# Patient Record
Sex: FEMALE | Race: WHITE | NOT HISPANIC OR LATINO | Employment: STUDENT | URBAN - METROPOLITAN AREA
[De-identification: names, ages, dates, MRNs, and addresses within clinical notes are randomized per-mention and may not be internally consistent; named-entity substitution may affect disease eponyms.]

---
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
## 2021-10-12 ENCOUNTER — OFFICE VISIT (OUTPATIENT)
Dept: FAMILY MEDICINE CLINIC | Facility: CLINIC | Age: 21
End: 2021-10-12
Payer: COMMERCIAL

## 2021-10-12 VITALS
TEMPERATURE: 96.8 F | DIASTOLIC BLOOD PRESSURE: 68 MMHG | SYSTOLIC BLOOD PRESSURE: 92 MMHG | BODY MASS INDEX: 19.46 KG/M2 | HEIGHT: 64 IN | OXYGEN SATURATION: 98 % | WEIGHT: 114 LBS | RESPIRATION RATE: 18 BRPM | HEART RATE: 87 BPM

## 2021-10-12 DIAGNOSIS — Z00.00 WELL ADULT EXAM: Primary | ICD-10-CM

## 2021-10-12 PROCEDURE — 99173 VISUAL ACUITY SCREEN: CPT | Performed by: FAMILY MEDICINE

## 2021-10-12 PROCEDURE — 92551 PURE TONE HEARING TEST AIR: CPT | Performed by: FAMILY MEDICINE

## 2021-10-12 PROCEDURE — 3725F SCREEN DEPRESSION PERFORMED: CPT | Performed by: FAMILY MEDICINE

## 2021-10-12 PROCEDURE — 3008F BODY MASS INDEX DOCD: CPT | Performed by: FAMILY MEDICINE

## 2021-10-12 PROCEDURE — 99395 PREV VISIT EST AGE 18-39: CPT | Performed by: FAMILY MEDICINE

## 2021-10-12 PROCEDURE — 1036F TOBACCO NON-USER: CPT | Performed by: FAMILY MEDICINE

## 2021-10-12 RX ORDER — SUMATRIPTAN 100 MG/1
1 TABLET, FILM COATED ORAL ONCE AS NEEDED
COMMUNITY
Start: 2021-08-04

## 2021-10-12 RX ORDER — PAROXETINE HYDROCHLORIDE 20 MG/1
1 TABLET, FILM COATED ORAL DAILY
COMMUNITY
Start: 2021-09-13

## 2021-11-24 ENCOUNTER — OFFICE VISIT (OUTPATIENT)
Dept: FAMILY MEDICINE CLINIC | Facility: CLINIC | Age: 21
End: 2021-11-24
Payer: COMMERCIAL

## 2021-11-24 VITALS
WEIGHT: 117 LBS | DIASTOLIC BLOOD PRESSURE: 60 MMHG | HEART RATE: 88 BPM | SYSTOLIC BLOOD PRESSURE: 100 MMHG | RESPIRATION RATE: 16 BRPM | BODY MASS INDEX: 19.97 KG/M2 | HEIGHT: 64 IN | TEMPERATURE: 96.2 F

## 2021-11-24 DIAGNOSIS — N60.11 FIBROCYSTIC BREAST CHANGES OF BOTH BREASTS: Primary | ICD-10-CM

## 2021-11-24 DIAGNOSIS — N60.12 FIBROCYSTIC BREAST CHANGES OF BOTH BREASTS: Primary | ICD-10-CM

## 2021-11-24 PROCEDURE — 3008F BODY MASS INDEX DOCD: CPT | Performed by: FAMILY MEDICINE

## 2021-11-24 PROCEDURE — 99213 OFFICE O/P EST LOW 20 MIN: CPT | Performed by: FAMILY MEDICINE

## 2021-11-24 PROCEDURE — 1036F TOBACCO NON-USER: CPT | Performed by: FAMILY MEDICINE

## 2023-08-17 NOTE — PROGRESS NOTES
Pt states she was out of school Mon  And Tuesday with S T , and H/A   Also did not go to work tonight b/c still feeling "tired" needs note Detail Level: Zone General Sunscreen Counseling: I recommended a broad spectrum sunscreen with a SPF of 30 or higher.  I explained that SPF 30 sunscreens block approximately 97 percent of the sun's harmful rays.  Sunscreens should be applied at least 15 minutes prior to expected sun exposure and then every 2 hours after that as long as sun exposure continues. If swimming or exercising sunscreen should be reapplied every 45 minutes to an hour after getting wet or sweating.  One ounce, or the equivalent of a shot glass full of sunscreen, is adequate to protect the skin not covered by a bathing suit. I also recommended a lip balm with a sunscreen as well. Sun protective clothing can be used in lieu of sunscreen but must be worn the entire time you are exposed to the sun's rays. Products Recommended: And hats and covering skin

## 2023-12-26 NOTE — PATIENT INSTRUCTIONS
PT Evaluation     Today's date: 2023  Patient name: Joanie Dobbins  : 1968  MRN: [id number]  Referring provider: Amanda Moreno MD  Dx:   Encounter Diagnosis     ICD-10-CM    1. Tendinitis of right rotator cuff  M75.81 Ambulatory Referral to Physical Therapy          Start Time: 1425          Assessment  Assessment details: Joanie Dobbins is a 55 y.o. female presenting to outpatient physical therapy at [facility] with complaints of R Shoulder pain.  They present with decreased range of motion, decreased strength, limited flexibility, poor postural awareness, poor body mechanics, poor balance, decreased tolerance to activity and decreased functional mobility due to Tendinitis of right rotator cuff  (primary encounter diagnosis).  They would benefit from skilled physical therapy to address noted impairments in order to allow for full functional return to work and ADL-related activities. Thank you for the referral!  Impairments: abnormal muscle firing, abnormal or restricted ROM, activity intolerance, impaired balance, impaired physical strength, lacks appropriate home exercise program, pain with function and poor body mechanics  Barriers to therapy: n/a  Understanding of Dx/Px/POC: excellent  Goals  ST.  Independent with HEP in 2 weeks  2. Decrease pain by 50% in 3 wks  3.  Increase R shoulder flexion AROM to 150 degrees in 3 weeks     LT. Achieve FOTO score of 67/100 in 6 weeks   2.  Able to return to all yoga poses without restriction secondary to pain in 6 weeks  3.  Strength = 5/5 bilat shoulder flex in 6 weeks      Plan  Plan details: RE in 6 weeks.  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 Onset September 2023, 4 months ago. No known JIN. She was doing yoga and the instructor adjusted her arm. Shortly after she noticed shoulder pain. She tried injections without long term relief. She is currently getting certified as a  and her pain is keeping her from completing certain movements.     Recent R shoulder MRI shows: \"IMPRESSION:  1. Severe supraspinatus and mild infraspinatus tendinosis without discrete tear.  2. Mild subacromial/subdeltoid bursitis.  3. Moderate acromioclavicular osteoarthritis.\"    Pain is located anterior shoulder with occasional N/T radiation to the R elbow. Rated 5-8/10. Agg with reaching up or back, lifting, turning the wheel while driving. Eased with ice, hot shower. Positive for sleep disturbance secondary to pain. Denies catching, clicking, locking. Positive for R arm weakness.    Pt works as a mental health therapist; mainly sitting all day. She will hand write her notes. She practices yoga daily, about 14 hrs per week.    Patient Goals  Patient goals for therapy: decreased edema, decreased pain, improved balance, increased motion, return to work, return to sport/leisure activities, independence with ADLs/IADLs and increased strength          Objective     Observations     Additional Observation Details  Rounded shoulders    Tenderness     Additional Tenderness Details  TTP R ant delt, prox biceps, pec nancy/min, UT, LS, supraspin, infraspin, teres, lat, subscap    (-) sulcus  (+) painful arc    Cervical/Thoracic Screen   Cervical range of motion within normal limits  Thoracic range of motion within normal limits    Neurological " "Testing     Sensation     Shoulder   Left Shoulder   Intact: light touch    Right Shoulder   Intact: Light touch    Active Range of Motion   Left Shoulder   Flexion: 155 degrees WFL  Extension: 72 degrees WFL  Abduction: 155 degrees WFL  External rotation 0°: 66 degrees WFL  External rotation BTH: T3 WFL  Internal rotation BTB: T3 WFL    Right Shoulder   Flexion: 147 degrees with pain  Extension: 65 degrees with pain  Abduction: 138 degrees with pain  External rotation 0°: 60 degrees   External rotation BTH: T3 with pain  Internal rotation BTB: T5 with pain    Strength/Myotome Testing     Left Shoulder     Planes of Motion   Flexion: 4+   Extension: 5   Abduction: 5   External rotation at 0°: 4+   Internal rotation at 0°: 4+     Isolated Muscles   Biceps: 5   Triceps: 5     Right Shoulder     Planes of Motion   Flexion: 4+   Extension: 5   Abduction: 5   External rotation at 0°: 4+   Internal rotation at 0°: 4+     Isolated Muscles   Biceps: 5   Triceps: 5              Precautions: severe R supraspin, infra tendinosis      HEP:  Access Code: [email or web address]  URL: [email or web address]  Date: 12/26/2023  Prepared by: Humera Saab    Exercises  - Standing Isometric Shoulder External Rotation with Doorway  - 10 reps - 5 sec hold  - Standing Isometric Shoulder Flexion with Doorway - Arm Bent  - 10 reps - 5 sec hold  - Standing Isometric Shoulder Abduction with Doorway - Arm Bent  - 10 reps - 5 sec hold      Manuals 12/26            R shoulder STM Ant delt, RC            R sh' inf mob GORAN            R sh' AP mob GORAN            R sh' flex iso Elbow bent 15\"x3            R sh' ER iso 15\"x3            R sh' abd iso In S/L, elbow bent 15\"x3                         Neuro Re-Ed                                                                 Ther Ex             UBE - cardio, ROM             pulleys             Sh' flex iso 5\"x10            Sh' ER iso 5\"x10            Sh' IR iso             Sh' abd iso 5\"x10          "   Sh' ext iso             Bicep curl             Tricep ext             Serratus punch             Prone T             Prone Y             Sh' ext             row             scaption                          Ther Activity                                       Gait Training                                       Modalities

## 2024-12-26 ENCOUNTER — OFFICE VISIT (OUTPATIENT)
Dept: FAMILY MEDICINE CLINIC | Facility: CLINIC | Age: 24
End: 2024-12-26
Payer: COMMERCIAL

## 2024-12-26 VITALS
HEART RATE: 96 BPM | HEIGHT: 64 IN | BODY MASS INDEX: 23.82 KG/M2 | WEIGHT: 139.5 LBS | TEMPERATURE: 98.3 F | OXYGEN SATURATION: 100 % | RESPIRATION RATE: 16 BRPM | DIASTOLIC BLOOD PRESSURE: 72 MMHG | SYSTOLIC BLOOD PRESSURE: 124 MMHG

## 2024-12-26 DIAGNOSIS — Z23 ENCOUNTER FOR IMMUNIZATION: ICD-10-CM

## 2024-12-26 DIAGNOSIS — Z12.4 PAP SMEAR FOR CERVICAL CANCER SCREENING: ICD-10-CM

## 2024-12-26 DIAGNOSIS — Z00.00 ANNUAL PHYSICAL EXAM: Primary | ICD-10-CM

## 2024-12-26 PROCEDURE — 99385 PREV VISIT NEW AGE 18-39: CPT

## 2024-12-26 PROCEDURE — 90471 IMMUNIZATION ADMIN: CPT

## 2024-12-26 PROCEDURE — 90715 TDAP VACCINE 7 YRS/> IM: CPT

## 2024-12-26 NOTE — PROGRESS NOTES
Adult Annual Physical  Name: Nely Mitchell      : 2000      MRN: [id number]  Encounter Provider: Cehvy Sumner DO  Encounter Date: 2024   Encounter department: [facility]    Assessment & Plan  Annual physical exam  - screening for cervical cancer: never had pap, would like referral to gyn, provided today  - immunizations: tdap due now, given today  - counseled pt on lifestyle modifications such as diet changes and 150 mins/weekly of moderate intensity exercise        Pap smear for cervical cancer screening    Orders:    Ambulatory Referral to Obstetrics / Gynecology; Future    Encounter for immunization    Orders:    TDAP VACCINE GREATER THAN OR EQUAL TO 8YO IM    Immunizations and preventive care screenings were discussed with patient today. Appropriate education was printed on patient's after visit summary.    Counseling:  Alcohol/drug use: discussed moderation in alcohol intake, the recommendations for healthy alcohol use, and avoidance of illicit drug use.  Dental Health: discussed importance of regular tooth brushing, flossing, and dental visits.  Injury prevention: discussed safety/seat belts, safety helmets, smoke detectors, carbon monoxide detectors, and smoking near bedding or upholstery.  Sexual health: discussed sexually transmitted diseases, partner selection, use of condoms, avoidance of unintended pregnancy, and contraceptive alternatives.  Exercise: the importance of regular exercise/physical activity was discussed. Recommend exercise 3-5 times per week for at least 30 minutes.          History of Present Illness     Adult Annual Physical:  Patient presents for annual physical. 24-year-old female presents to [facility] care and for annual physical.  Past medical history significant for scoliosis and migraines (previously followed with neurology and was on Paxil and as needed sumatriptan but currently off medications except for as needed Tylenol).  Gets migraines about  twice a month around the time of her periods.  Also reports that every once in a while she feels her heart is racing even when she is not anxious.  Typically results resolves within a minute.  Sometimes has associated sharp pain but again this resolves quickly.  Has a history of costochondritis.  No family history of heart disease.  No associated shortness of breath has 1 cup of coffee a day, no energy drinks.  No history of smoking or illicit substance use.    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  She recently completed her masters program.  Would like a referral to.     Diet and Physical Activity:  - Diet/Nutrition: consuming 3-5 servings of fruits/vegetables daily, adequate whole grain intake, well balanced diet and limited junk food.  - Exercise: no formal exercise and walking.    Depression Screening:  - PHQ-2 Score: 0    General Health:  - Sleep: sleeps well and 7-8 hours of sleep on average. does snore  - Hearing: normal hearing bilateral ears.  - Vision: no vision problems, wears glasses and goes for regular eye exams. glasses for distance  - Dental: regular dental visits and brushes teeth twice daily. floss occasionally    /GYN Health:  - Follows with GYN: no.   - Last menstrual cycle: 12/2/2024.   - History of STDs: no  - Contraception:. period lasts 5-6 days, regular      Review of Systems   Constitutional:  Negative for chills and fever.   HENT:  Negative for congestion and rhinorrhea.    Eyes:  Negative for visual disturbance.   Respiratory:  Negative for cough and shortness of breath.    Cardiovascular:  Negative for chest pain and palpitations.   Gastrointestinal:  Negative for abdominal pain, constipation, diarrhea, nausea and vomiting.   Genitourinary:  Negative for dysuria.   Musculoskeletal:  Negative for arthralgias.   Skin:  Negative for rash.   Neurological:  Negative for dizziness, light-headedness and headaches.     Current Outpatient Medications on File Prior to Visit  "  Medication Sig Dispense Refill    Multiple Vitamin (MULTIVITAMIN) capsule Take 1 capsule by mouth daily      [DISCONTINUED] PARoxetine (PAXIL) 20 mg tablet Take 1 tablet by mouth daily      [DISCONTINUED] SUMAtriptan (IMITREX) 100 mg tablet Take 1 tablet by mouth once as needed        No current facility-administered medications on file prior to visit.        Objective   /72 (BP Location: Left arm, Patient Position: Sitting, Cuff Size: Standard)   Pulse 96   Temp 98.3 °F (36.8 °C) (Temporal)   Resp 16   Ht 5' 4\" (1.626 m)   Wt 63.3 kg (139 lb 8 oz)   SpO2 100%   BMI 23.95 kg/m²     Physical Exam  Vitals reviewed.   Constitutional:       Appearance: Normal appearance.   HENT:      Head: Normocephalic and atraumatic.      Right Ear: External ear normal.      Left Ear: External ear normal.      Nose: Nose normal.      Mouth/Throat:      Pharynx: Oropharynx is clear.   Eyes:      Extraocular Movements: Extraocular movements intact.      Conjunctiva/sclera: Conjunctivae normal.   Cardiovascular:      Rate and Rhythm: Normal rate and regular rhythm.      Pulses: Normal pulses.      Heart sounds: Normal heart sounds.   Pulmonary:      Effort: Pulmonary effort is normal.      Breath sounds: Normal breath sounds.   Abdominal:      Palpations: Abdomen is soft.   Musculoskeletal:      Cervical back: Neck supple.      Right lower leg: No edema.      Left lower leg: No edema.   Skin:     General: Skin is warm.   Neurological:      Mental Status: She is alert and oriented to person, place, and time.   Psychiatric:         Mood and Affect: Mood normal.         Behavior: Behavior normal.         Thought Content: Thought content normal.         Judgment: Judgment normal.         "

## 2025-03-08 ENCOUNTER — OFFICE VISIT (OUTPATIENT)
Dept: URGENT CARE | Facility: MEDICAL CENTER | Age: 25
End: 2025-03-08
Payer: COMMERCIAL

## 2025-03-08 VITALS
OXYGEN SATURATION: 98 % | SYSTOLIC BLOOD PRESSURE: 131 MMHG | HEART RATE: 104 BPM | RESPIRATION RATE: 18 BRPM | DIASTOLIC BLOOD PRESSURE: 73 MMHG | TEMPERATURE: 98.2 F

## 2025-03-08 DIAGNOSIS — J02.9 SORE THROAT: Primary | ICD-10-CM

## 2025-03-08 LAB — S PYO AG THROAT QL: NEGATIVE

## 2025-03-08 PROCEDURE — G0383 LEV 4 HOSP TYPE B ED VISIT: HCPCS | Performed by: PHYSICIAN ASSISTANT

## 2025-03-08 PROCEDURE — 87880 STREP A ASSAY W/OPTIC: CPT | Performed by: PHYSICIAN ASSISTANT

## 2025-03-08 PROCEDURE — S9083 URGENT CARE CENTER GLOBAL: HCPCS | Performed by: PHYSICIAN ASSISTANT

## 2025-03-08 PROCEDURE — 87070 CULTURE OTHR SPECIMN AEROBIC: CPT | Performed by: PHYSICIAN ASSISTANT

## 2025-03-08 RX ORDER — PREDNISONE 20 MG/1
40 TABLET ORAL DAILY
Qty: 10 TABLET | Refills: 0 | Status: SHIPPED | OUTPATIENT
Start: 2025-03-08 | End: 2025-03-13

## 2025-03-08 NOTE — PATIENT INSTRUCTIONS
Pharyngitis  Prednisone once daily x 5 days  Salt water gargles  Follow up with PCP in 3-5 days.  Proceed to  ER if symptoms worsen.

## 2025-03-08 NOTE — PROGRESS NOTES
[facility]        NAME: Nely Mitchell is a 24 y.o. female  : 2000    MRN: [id number]  DATE: 2025  TIME: 3:38 PM    Assessment and Plan   Sore throat [J02.9]  1. Sore throat  POCT rapid ANTIGEN strepA            Patient Instructions     Pharyngitis  Prednisone once daily x 5 days  Salt water gargles  Follow up with PCP in 3-5 days.  Proceed to  ER if symptoms worsen.    Chief Complaint     Chief Complaint   Patient presents with    Sore Throat     Sore throat, sowllen glands, loss of voice; started 6 days ago     Laryngitis         History of Present Illness       24-year-old female who presents complaining of sore throat, pain on swallowing, fever Tmax 100.9  that started 3 days ago.  The pain has since resolved but now she has loss of voice.  Has been taking over-the-counter medication for symptom relief.    Sore Throat   This is a new problem. The current episode started in the past 7 days. The problem has been waxing and waning. Neither side of throat is experiencing more pain than the other. The maximum temperature recorded prior to her arrival was 100 - 100.9 F. The fever has been present for Less than 1 day. The pain is at a severity of 1/10. The pain is mild. Associated symptoms include congestion, coughing, ear pain, headaches, a hoarse voice, stridor and swollen glands. Pertinent negatives include no abdominal pain, diarrhea, drooling, ear discharge, plugged ear sensation, neck pain, shortness of breath, trouble swallowing or vomiting.       Review of Systems   Review of Systems   HENT:  Positive for congestion, ear pain, hoarse voice and sore throat. Negative for drooling, ear discharge and trouble swallowing.    Respiratory:  Positive for cough and stridor. Negative for shortness of breath.    Gastrointestinal:  Negative for abdominal pain, diarrhea and vomiting.   Musculoskeletal:  Negative for neck pain.   Neurological:  Positive for headaches.         Current Medications        Current Outpatient Medications:     Multiple Vitamin (MULTIVITAMIN) capsule, Take 1 capsule by mouth daily, Disp: , Rfl:     Current Allergies     Allergies as of 03/08/2025 - Reviewed 03/08/2025   Allergen Reaction Noted    Doxycycline Headache 03/29/2018            The following portions of the patient's history were reviewed and updated as appropriate: allergies, current medications, past family history, past medical history, past social history, past surgical history and problem list.     Past Medical History:   Diagnosis Date    Anxiety     Headache(784.0)     Known health problems: none     Scoliosis        Past Surgical History:   Procedure Laterality Date    CYSTOSCOPY      Diagnostic. Last assessed 8/26/2015      NO PAST SURGERIES         Family History   Problem Relation Age of Onset    Cholelithiasis Mother     Anxiety disorder Mother     Hyperlipidemia Father     Breast cancer Maternal Grandmother     Cervical cancer Maternal Grandmother     Dementia Maternal Grandmother     Cancer Maternal Grandmother     Breast cancer Paternal Aunt     Arthritis Family     Breast cancer Family     Cervical cancer Family     Cancer Paternal Aunt          Medications have been verified.        Objective   /73   Pulse 104   Temp 98.2 °F (36.8 °C) (Temporal)   Resp 18   SpO2 98%        Physical Exam     Physical Exam  Constitutional:       General: She is not in acute distress.     Appearance: She is well-developed. She is not diaphoretic.   HENT:      Head: Normocephalic and atraumatic.      Jaw: No trismus.      Right Ear: Hearing, tympanic membrane, ear canal and external ear normal. No drainage, swelling or tenderness. No middle ear effusion. Tympanic membrane is not erythematous.      Left Ear: Hearing, tympanic membrane, ear canal and external ear normal. No drainage, swelling or tenderness.  No middle ear effusion. Tympanic membrane is not erythematous.      Mouth/Throat:      Mouth: Mucous membranes  are moist. No oral lesions.      Pharynx: Oropharynx is clear. Uvula midline. Posterior oropharyngeal erythema present. No oropharyngeal exudate or uvula swelling.      Tonsils: No tonsillar abscesses.   Cardiovascular:      Rate and Rhythm: Normal rate and regular rhythm.      Heart sounds: Normal heart sounds.   Pulmonary:      Effort: Pulmonary effort is normal.      Breath sounds: Normal breath sounds.   Musculoskeletal:      Cervical back: Normal range of motion and neck supple.   Lymphadenopathy:      Cervical: Cervical adenopathy present.

## 2025-03-08 NOTE — LETTER
March 8, 2025     Patient: Nely Mitchell   YOB: 2000   Date of Visit: 3/8/2025       To Whom it May Concern:    Nely Mitchell was seen in my clinic on 3/8/2025. She may return to work on 3/12/2025 .    If you have any questions or concerns, please don't hesitate to call.         Sincerely,          Michael Trinh PA-C        CC: No Recipients

## 2025-03-11 ENCOUNTER — RESULTS FOLLOW-UP (OUTPATIENT)
Dept: URGENT CARE | Facility: MEDICAL CENTER | Age: 25
End: 2025-03-11

## 2025-03-11 LAB — BACTERIA THROAT CULT: NORMAL

## 2025-03-15 ENCOUNTER — TELEMEDICINE (OUTPATIENT)
Dept: OTHER | Facility: HOSPITAL | Age: 25
End: 2025-03-15
Payer: COMMERCIAL

## 2025-03-15 VITALS — TEMPERATURE: 97 F

## 2025-03-15 DIAGNOSIS — J01.90 ACUTE SINUSITIS, RECURRENCE NOT SPECIFIED, UNSPECIFIED LOCATION: Primary | ICD-10-CM

## 2025-03-15 PROCEDURE — 99213 OFFICE O/P EST LOW 20 MIN: CPT | Performed by: PHYSICIAN ASSISTANT

## 2025-03-15 NOTE — PROGRESS NOTES
Virtual Regular VisitName: Nely Mitchell      : 2000      MRN: [id number]  Encounter Provider: Eusebio River PA-C  Encounter Date: 3/15/2025   Encounter department: VIRTUAL CARE   :  Assessment & Plan  Acute sinusitis, recurrence not specified, unspecified location  Follow up with PCP if no improvement  Orders:  •  amoxicillin-clavulanate (AUGMENTIN) 875-125 mg per tablet; Take 1 tablet by mouth every 12 (twelve) hours for 10 days        History of Present Illness {?Quick Links Encounters * My Last Note * Last Note in Specialty * Snapshot * Since Last Visit * History :[id number]}    Pt reports feeling better while on prednisone recently . This past Thursday began having body aches, skin sore to touch back of neck and shoulders/back worst, none in the lower extremities, throat began hurting again. Swollen glands again. Yellow/green mucus coming from nose. Feels a little nasal congestion, takes tylenol with relief. No rash. No home covid test. Mild dry cough.       Review of Systems   Constitutional:  Negative for chills and fever.   HENT:  Positive for congestion, sinus pressure, sinus pain and sore throat.    Respiratory:  Negative for cough and shortness of breath.    Cardiovascular:  Negative for chest pain.   Musculoskeletal:  Positive for myalgias.       Objective {?Quick Links Trend Vitals * Enter New Vitals * Results Review * Timeline (Adult) * Labs * Imaging * Cardiology * Procedures * Lung Cancer Screening * Surgical eConsent :83844}  Temp (!) 97 °F (36.1 °C) (Oral)     Physical Exam  Constitutional:       General: She is not in acute distress.     Appearance: Normal appearance. She is not ill-appearing or toxic-appearing.   HENT:      Head: Normocephalic and atraumatic.      Nose: No rhinorrhea.      Mouth/Throat:      Mouth: Mucous membranes are moist.      Pharynx: Posterior oropharyngeal erythema present. No oropharyngeal exudate.      Comments: Exam limited by virtual platform  Eyes:       General: No scleral icterus.        Right eye: No discharge.         Left eye: No discharge.      Extraocular Movements: Extraocular movements intact.   Cardiovascular:      Rate and Rhythm: Normal rate.   Pulmonary:      Effort: Pulmonary effort is normal. No respiratory distress.   Musculoskeletal:      Cervical back: Neck supple.   Skin:     Findings: No erythema.   Neurological:      Mental Status: She is alert and oriented to person, place, and time.      Cranial Nerves: No dysarthria or facial asymmetry.   Psychiatric:         Mood and Affect: Mood normal.         Behavior: Behavior normal.         Administrative Statements   Encounter provider Eusebio River PA-C    The Patient is located at Home and in the following state in which I hold an active license [place].    The patient was identified by name and date of birth. Nely NILA Mitchell was informed that this is a telemedicine visit and that the visit is being conducted through the Epic Embedded platform. She agrees to proceed..  My office door was closed. No one else was in the room.  She acknowledged consent and understanding of privacy and security of the video platform. The patient has agreed to participate and understands they can discontinue the visit at any time.    I have spent a total time of 14 minutes in caring for this patient on the day of the visit/encounter including Risks and benefits of tx options, Impressions, and Documenting in the medical record, not including the time spent for establishing the audio/video connection.

## 2025-04-21 ENCOUNTER — OFFICE VISIT (OUTPATIENT)
Dept: FAMILY MEDICINE CLINIC | Facility: CLINIC | Age: 25
End: 2025-04-21
Payer: COMMERCIAL

## 2025-04-21 VITALS
RESPIRATION RATE: 18 BRPM | HEART RATE: 85 BPM | WEIGHT: 138 LBS | OXYGEN SATURATION: 100 % | BODY MASS INDEX: 23.56 KG/M2 | SYSTOLIC BLOOD PRESSURE: 122 MMHG | DIASTOLIC BLOOD PRESSURE: 76 MMHG | HEIGHT: 64 IN | TEMPERATURE: 98 F

## 2025-04-21 DIAGNOSIS — R20.2 NUMBNESS AND TINGLING IN BOTH HANDS: Primary | ICD-10-CM

## 2025-04-21 DIAGNOSIS — R20.0 NUMBNESS AND TINGLING IN BOTH HANDS: Primary | ICD-10-CM

## 2025-04-21 PROCEDURE — 99214 OFFICE O/P EST MOD 30 MIN: CPT

## 2025-04-21 NOTE — PROGRESS NOTES
Name: Nely Mitchell      : 2000      MRN: [id number]  Encounter Provider: Chevy Sumner DO  Encounter Date: 2025   Encounter department: [facility]  :  Assessment & Plan  Numbness and tingling in both hands  Episode of numbness and tingling in both hands, initially began in the left hand then developed in the right hand that occurred about 2 days ago.  Associated with slight pain and decreased  strength at the time.  Episode resolved within 4 hours. Reports some soreness in the hypothenar region of the L hand but otherwise back to baseline. ROS otherwise negative. No specific triggers that patient can think of.  Never had anything like this in the past.  Neuroexam unremarkable in office today.  Would recommend close monitoring of symptoms.  If continues to recur consider EMG both upper extremities.  ED precautions reviewed for new onset weakness, swelling, significant pain, etc              History of Present Illness   HPI  Chief Complaint   Patient presents with    Generalized Body Aches     X2 days ago, tingling and numb hands, yesterday  issues, hands a little sore, pain is moving up both hands, started on left side      Patient reports isolated episode of tingling and numbness in her left hand but also developed in her right hand about 2 days ago.  Associated with poor  strength at the time and some slight pain.  Denies any burning sensation but felt some extension of the numbness and tingling in her left hand upper arm.  Symptoms dissipated within about 4 hours.  Continues to have some soreness in the hypothenar eminence of the left hand.  Cannot think of any specific triggers.  She was sitting and leaning on her left side but denies any lifting of heavy objects, neck pain, shoulder pain, etc.  Denies dropping any objects, overt weakness or similar symptoms in lower extremities.  Has never had anything like this in the past.    Does report a history of  "osteoarthritis and migraines that usually resolve with Tylenol/Motrin.      Review of Systems   Constitutional:  Negative for chills and fever.   HENT:  Negative for congestion and rhinorrhea.    Eyes:  Negative for visual disturbance.   Respiratory:  Negative for cough and shortness of breath.    Cardiovascular:  Negative for chest pain and palpitations.   Gastrointestinal:  Negative for abdominal pain, constipation, diarrhea, nausea and vomiting.   Genitourinary:  Negative for dysuria.   Musculoskeletal:  Negative for arthralgias.   Skin:  Negative for rash.   Neurological:  Negative for dizziness, light-headedness and headaches.       Objective   /76 (BP Location: Left arm, Patient Position: Sitting, Cuff Size: Standard)   Pulse 85   Temp 98 °F (36.7 °C) (Temporal)   Resp 18   Ht 5' 4\" (1.626 m)   Wt 62.6 kg (138 lb)   SpO2 100%   BMI 23.69 kg/m²      Physical Exam  Vitals reviewed.   Constitutional:       Appearance: Normal appearance.   HENT:      Head: Normocephalic and atraumatic.      Right Ear: External ear normal.      Left Ear: External ear normal.      Nose: Nose normal.      Mouth/Throat:      Pharynx: Oropharynx is clear.   Eyes:      Extraocular Movements: Extraocular movements intact.      Conjunctiva/sclera: Conjunctivae normal.   Cardiovascular:      Rate and Rhythm: Normal rate and regular rhythm.      Pulses: Normal pulses.      Heart sounds: Normal heart sounds.   Pulmonary:      Effort: Pulmonary effort is normal.      Breath sounds: Normal breath sounds.   Abdominal:      Palpations: Abdomen is soft.   Musculoskeletal:      Cervical back: Neck supple.      Right lower leg: No edema.      Left lower leg: No edema.   Skin:     General: Skin is warm.   Neurological:      General: No focal deficit present.      Mental Status: She is alert and oriented to person, place, and time.      Cranial Nerves: No cranial nerve deficit.      Sensory: No sensory deficit.      Motor: No weakness. "      Coordination: Coordination normal.      Gait: Gait normal.      Comments: No evidence of swelling, erythema or TTP over bilateral hands.  strength intact.  Range of motion of hand intact.  Negative Tinel's or Phalen's.   Psychiatric:         Mood and Affect: Mood normal.         Behavior: Behavior normal.         Thought Content: Thought content normal.         Judgment: Judgment normal.

## 2025-06-01 ENCOUNTER — OFFICE VISIT (OUTPATIENT)
Dept: URGENT CARE | Facility: CLINIC | Age: 25
End: 2025-06-01
Payer: COMMERCIAL

## 2025-06-01 VITALS
DIASTOLIC BLOOD PRESSURE: 87 MMHG | HEIGHT: 64 IN | WEIGHT: 132 LBS | SYSTOLIC BLOOD PRESSURE: 129 MMHG | BODY MASS INDEX: 22.53 KG/M2 | RESPIRATION RATE: 16 BRPM | HEART RATE: 101 BPM | TEMPERATURE: 98.1 F | OXYGEN SATURATION: 99 %

## 2025-06-01 DIAGNOSIS — J02.9 SORE THROAT: Primary | ICD-10-CM

## 2025-06-01 DIAGNOSIS — J06.9 VIRAL UPPER RESPIRATORY TRACT INFECTION: ICD-10-CM

## 2025-06-01 LAB — S PYO AG THROAT QL: NEGATIVE

## 2025-06-01 PROCEDURE — 87880 STREP A ASSAY W/OPTIC: CPT | Performed by: NURSE PRACTITIONER

## 2025-06-01 PROCEDURE — G0382 LEV 3 HOSP TYPE B ED VISIT: HCPCS | Performed by: NURSE PRACTITIONER

## 2025-06-01 PROCEDURE — S9083 URGENT CARE CENTER GLOBAL: HCPCS | Performed by: NURSE PRACTITIONER

## 2025-06-01 RX ORDER — PREDNISONE 20 MG/1
40 TABLET ORAL DAILY
Qty: 8 TABLET | Refills: 0 | Status: SHIPPED | OUTPATIENT
Start: 2025-06-01

## 2025-06-01 NOTE — PROGRESS NOTES
[facility]        NAME: Nely Mitchell is a 24 y.o. female  : 2000    MRN: [id number]  DATE: 2025  TIME: 10:29 AM    Assessment and Plan   Sore throat [J02.9]  1. Sore throat  predniSONE 20 mg tablet    POCT rapid ANTIGEN strepA      2. Viral upper respiratory tract infection              Patient Instructions     Patient Instructions   --Rest, drink plenty of fluids  --Rapid strep test negative.   --Take Rx prednisone as prescribed  --Consider vitamin C, zinc, quercetin, and vitamin D to help strengthen your immune system. Consider also black elderberry (Sambucol) which can be effective for flu symptoms.   --For cough, you can take an OTC expectorant such as plain Robitussion or Mucinex (active ingredient guaifenesin).  A spoonful of honey at bedtime may also be helpful, as may a prescription cough medicine.  Also recommended is the use of a cool mist humidifier (with or without Vicks) in the bedroom at night.   --For sore throat, you can take OTC lozenges, use warm gargles (salt water or apple cider vinegar and honey), herbal teas, or an OTC throat spray (Chloraseptic).  --For nasal/sinus congestion, helpful measures include steam, warm compresses, an OTC saline nasal spray or Neti pot, or an OTC decongestant (such as Sudafed).  The decongestant should be avoided, however, if you are under 6 years of age, or have a history of high blood pressure or heart disease.  In addition, an OTC nasal steroid (Flonase, Nasocort) or nasal decongestant (Afrin, David-synephrine) may be taken.  The nasal steroid should be used at bedtime, after the saline nasal spray. The nasal decongestant should not be taken more than 3 days consecutively in order to prevent rebound congestion.   --For nasal drainage, postnasal drip, sneezing and itching, an OTC antihistamine (Allegra, Benadryl, etc) can be taken.   --You can take Tylenol or Motrin/Advil as needed for fever, headache, body aches. Motrin/Advil should be  avoided, however, if you have a history of heart disease, bleeding ulcers, or if you take blood thinners.   --You should contact your primary care provider and/or go to the ER if your symptoms are not improved or get worse over the next 7 days.  This includes new onset fever, localized ear pain, sinus pain, as well as worsening cough, chest pain, shortness of breath, or significant weakness/fatigue.          If tests have been performed at [facility], our office will contact you with results if changes need to be made to the care plan discussed with you at the visit.  You can review your full results on [facility].    Chief Complaint     Chief Complaint   Patient presents with    Sore Throat     Sore throat x 4-5 days.          History of Present Illness       Here with father for complaints of sore throat, pain with swallowing, loss of voice, nasal congestion, rhinorrhea, minimally productive cough x 4 days.  No fever, headache, body aches.  No ear pain  No GI complaints.  Taking DayQuil.  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    Sore Throat   This is a new problem. The current episode started in the past 7 days. The problem has been waxing and waning. Neither side of throat is experiencing more pain than the other. There has been no fever. The fever has been present for Less than 1 day. The pain is at a severity of 6/10. The pain is moderate. Associated symptoms include congestion, coughing, a hoarse voice and stridor. Pertinent negatives include no abdominal pain, diarrhea, drooling, ear discharge, ear pain, headaches, plugged ear sensation, neck pain, shortness of breath, swollen glands, trouble swallowing or vomiting.       Review of Systems   Review of Systems   HENT:  Positive for congestion, hoarse voice and sore throat. Negative for drooling, ear discharge, ear pain and trouble swallowing.    Respiratory:  Positive for cough and stridor. Negative for shortness of breath.   "  Gastrointestinal:  Negative for abdominal pain, diarrhea and vomiting.   Musculoskeletal:  Negative for neck pain.   Neurological:  Negative for headaches.         Current Medications     Current Medications[1]    Current Allergies     Allergies as of 06/01/2025 - Reviewed 06/01/2025   Allergen Reaction Noted    Doxycycline Headache 03/29/2018            The following portions of the patient's history were reviewed and updated as appropriate: allergies, current medications, past family history, past medical history, past social history, past surgical history and problem list.     Past Medical History[2]    Past Surgical History[2]    Family History[2]      Medications have been verified.        Objective   /87   Pulse 101   Temp 98.1 °F (36.7 °C)   Resp 16   Ht 5' 4\" (1.626 m)   Wt 59.9 kg (132 lb)   SpO2 99%   BMI 22.66 kg/m²   No LMP recorded.       Physical Exam     Physical Exam  Constitutional:       General: She is not in acute distress.     Appearance: She is not ill-appearing, toxic-appearing or diaphoretic.   HENT:      Right Ear: Tympanic membrane and ear canal normal. There is no impacted cerumen.      Left Ear: Tympanic membrane and ear canal normal. There is no impacted cerumen.      Nose: Rhinorrhea present. No congestion.      Mouth/Throat:      Pharynx: Posterior oropharyngeal erythema present. No oropharyngeal exudate.      Comments: Tonsils 1+, mildly erythematous, without exudate.  Neck:      Comments: Mild left anterior cervical tenderness, with no appreciable LAD.  Cardiovascular:      Rate and Rhythm: Normal rate and regular rhythm.   Pulmonary:      Effort: Pulmonary effort is normal.      Breath sounds: Normal breath sounds.     Musculoskeletal:      Cervical back: Tenderness present.   Lymphadenopathy:      Cervical: No cervical adenopathy.     Neurological:      Mental Status: She is alert.                        [1]   Current Outpatient Medications:     predniSONE 20 mg " tablet, Take 2 tablets (40 mg total) by mouth daily, Disp: 8 tablet, Rfl: 0    Multiple Vitamin (MULTIVITAMIN) capsule, Take 1 capsule by mouth daily, Disp: , Rfl:   [2]   Past Medical History:  Diagnosis Date    Anxiety     Headache(784.0)     Known health problems: none     Scoliosis    [2]   Past Surgical History:  Procedure Laterality Date    CYSTOSCOPY      Diagnostic. Last assessed 8/26/2015      NO PAST SURGERIES     [2]   Family History  Problem Relation Name Age of Onset    Cholelithiasis Mother Yaquelin     Anxiety disorder Mother Yaquelin     Hyperlipidemia Father Del     Breast cancer Maternal Grandmother Fariba     Cervical cancer Maternal Grandmother Fariba     Dementia Maternal Grandmother Fariba     Cancer Maternal Grandmother Fariba     Breast cancer Paternal Aunt Ariella     Arthritis Family      Breast cancer Family      Cervical cancer Family      Cancer Paternal Aunt Ariella Wilmer     Cancer Paternal Aunt Ariella Guillen     Breast cancer Paternal Aunt Ariella Guillen

## 2025-06-01 NOTE — PATIENT INSTRUCTIONS
--Rest, drink plenty of fluids  --Rapid strep test negative.   --Take Rx prednisone as prescribed  --Consider vitamin C, zinc, quercetin, and vitamin D to help strengthen your immune system. Consider also black elderberry (Sambucol) which can be effective for flu symptoms.   --For cough, you can take an OTC expectorant such as plain Robitussion or Mucinex (active ingredient guaifenesin).  A spoonful of honey at bedtime may also be helpful, as may a prescription cough medicine.  Also recommended is the use of a cool mist humidifier (with or without Vicks) in the bedroom at night.   --For sore throat, you can take OTC lozenges, use warm gargles (salt water or apple cider vinegar and honey), herbal teas, or an OTC throat spray (Chloraseptic).  --For nasal/sinus congestion, helpful measures include steam, warm compresses, an OTC saline nasal spray or Neti pot, or an OTC decongestant (such as Sudafed).  The decongestant should be avoided, however, if you are under 6 years of age, or have a history of high blood pressure or heart disease.  In addition, an OTC nasal steroid (Flonase, Nasocort) or nasal decongestant (Afrin, David-synephrine) may be taken.  The nasal steroid should be used at bedtime, after the saline nasal spray. The nasal decongestant should not be taken more than 3 days consecutively in order to prevent rebound congestion.   --For nasal drainage, postnasal drip, sneezing and itching, an OTC antihistamine (Allegra, Benadryl, etc) can be taken.   --You can take Tylenol or Motrin/Advil as needed for fever, headache, body aches. Motrin/Advil should be avoided, however, if you have a history of heart disease, bleeding ulcers, or if you take blood thinners.   --You should contact your primary care provider and/or go to the ER if your symptoms are not improved or get worse over the next 7 days.  This includes new onset fever, localized ear pain, sinus pain, as well as worsening cough, chest pain, shortness of  breath, or significant weakness/fatigue.

## 2025-06-04 ENCOUNTER — HOSPITAL ENCOUNTER (EMERGENCY)
Facility: HOSPITAL | Age: 25
Discharge: HOME | End: 2025-06-04
Attending: EMERGENCY MEDICINE
Payer: COMMERCIAL

## 2025-06-04 ENCOUNTER — APPOINTMENT (EMERGENCY)
Dept: RADIOLOGY | Facility: HOSPITAL | Age: 25
End: 2025-06-04
Payer: COMMERCIAL

## 2025-06-04 VITALS
BODY MASS INDEX: 23.18 KG/M2 | DIASTOLIC BLOOD PRESSURE: 81 MMHG | HEIGHT: 64 IN | OXYGEN SATURATION: 100 % | SYSTOLIC BLOOD PRESSURE: 133 MMHG | WEIGHT: 135.8 LBS | RESPIRATION RATE: 18 BRPM | TEMPERATURE: 98.6 F | HEART RATE: 92 BPM

## 2025-06-04 DIAGNOSIS — R07.89 CHEST WALL PAIN: Primary | ICD-10-CM

## 2025-06-04 DIAGNOSIS — R00.2 PALPITATIONS: ICD-10-CM

## 2025-06-04 PROCEDURE — 71046 X-RAY EXAM CHEST 2 VIEWS: CPT

## 2025-06-04 PROCEDURE — 93005 ELECTROCARDIOGRAM TRACING: CPT

## 2025-06-04 PROCEDURE — 99285 EMERGENCY DEPT VISIT HI MDM: CPT

## 2025-06-04 PROCEDURE — 99284 EMERGENCY DEPT VISIT MOD MDM: CPT | Performed by: EMERGENCY MEDICINE

## 2025-06-04 RX ORDER — IBUPROFEN 400 MG/1
400 TABLET, FILM COATED ORAL ONCE
Status: COMPLETED | OUTPATIENT
Start: 2025-06-04 | End: 2025-06-04

## 2025-06-04 RX ADMIN — IBUPROFEN 400 MG: 400 TABLET, FILM COATED ORAL at 09:29

## 2025-06-04 NOTE — DISCHARGE INSTRUCTIONS
You were seen in the Emergency Department for: Chest pain    Your workup today showed: Normal EKG, normal chest x-ray    Your next steps should include: Use Tylenol and ibuprofen as needed for pain control, follow-up with your primary care doctor    Reasons to RETURN IMMEDIATELY to the Emergency Department: Your chest pain worsens, you develop shortness of breath or develop any new or worsening symptoms that concern you

## 2025-06-04 NOTE — ED ATTENDING ATTESTATION
"6/4/2025  I, Juan Robison MD, saw and evaluated the patient. I have discussed the patient with the resident/non-physician practitioner and agree with the resident's/non-physician practitioner's findings, Plan of Care, and MDM as documented in the resident's/non-physician practitioner's note, except where noted. All available labs and Radiology studies were reviewed.  I was present for key portions of any procedure(s) performed by the resident/non-physician practitioner and I was immediately available to provide assistance.       At this point I agree with the current assessment done in the Emergency Department.  I have conducted an independent evaluation of this patient a history and physical is as follows:    S:  Chief Complaint   Patient presents with    Chest Pain     Arrives with c/o left sided CP, favoring shoulder area, that started last night, was getting ready for bed when pain started, feels like pressure, hx of costochondritis, feels different than this. Pain radiates to back, Watch alerted patient that HR was elevated. Denies dizziness, reports \"difficulty catching my breath.\" Recent cold over the weekend, was taking prednisone.     Nely is a 24 y.o. female who presents with left sided chest pain (near the shoulder).  Onset was last night. She was able to go to sleep but this morning continued to have the discomfort.  While driving into work her apple watch alerted her that her heart rate was elevated and she came here for evaluation.  No nausea, vomiting, diaphoresis.  She reports she felt a little dyspneic, but does not now.  She did have a recent URI, is on her last day of bolus dose prednisone and was taking mucinex.  No leg swelling. No hypoxia.  No history of dvt/PE.  No cancer history.  No recent immobilization.     O:  ED Triage Vitals   Temperature Pulse Respirations Blood Pressure SpO2   06/04/25 0834 06/04/25 0834 06/04/25 0834 06/04/25 0834 06/04/25 0834   98.6 °F (37 °C) 92 18 " 133/81 100 %      Temp Source Heart Rate Source Patient Position - Orthostatic VS BP Location FiO2 (%)   06/04/25 0834 06/04/25 0834 06/04/25 0834 06/04/25 0834 --   Oral Monitor Sitting Right arm       Pain Score       06/04/25 0929       5         Physical Exam  Vitals and nursing note reviewed.   Constitutional:       General: She is in acute distress (mild).      Appearance: She is well-developed.   HENT:      Head: Normocephalic and atraumatic.     Eyes:      Pupils: Pupils are equal, round, and reactive to light.     Neck:      Vascular: No JVD.     Cardiovascular:      Rate and Rhythm: Normal rate and regular rhythm.      Heart sounds: Normal heart sounds. No murmur heard.     No friction rub. No gallop.   Pulmonary:      Effort: Pulmonary effort is normal. No respiratory distress.      Breath sounds: Normal breath sounds. No wheezing or rales.   Chest:      Chest wall: No tenderness.     Musculoskeletal:         General: No tenderness. Normal range of motion.      Cervical back: Normal range of motion.      Right lower leg: No edema.      Left lower leg: No edema.     Skin:     General: Skin is warm and dry.     Neurological:      General: No focal deficit present.      Mental Status: She is alert and oriented to person, place, and time.     Psychiatric:         Behavior: Behavior normal.         Thought Content: Thought content normal.         Judgment: Judgment normal.        A/P:  Background: 24 y.o. female otherwise healthy, presents with the chief complaint of left sided chest pain (reproducible with palpation and certain movements), in the setting of recent URI    Differential DX includes but is not limited to: arrhythmia, doubt ACS, doubt PE (low risk well's, PERC negative), possible costochondritis, possible pneumonia    Plan: EKG, chest xray, reassurance       ED Course     XR chest 2 views   ED Interpretation   No cardiopulmonary abnormalities      Final Result      No acute cardiopulmonary  disease.            Resident: ALEXUS HARDEN I, the attending radiologist, have reviewed the images and agree with the final report above.      Workstation performed: [id number]           Medications   ibuprofen (MOTRIN) tablet 400 mg (400 mg Oral Given 6/4/25 0929)         Critical Care Time  Procedures    Time reflects when diagnosis was documented in both MDM as applicable and the Disposition within this note       Time User Action Codes Description Comment    6/4/2025 10:02 AM Liseth Robison [R07.89] Chest wall pain     6/4/2025 10:02 AM Liseth Robison [R00.2] Palpitations           ED Disposition       ED Disposition   Discharge    Condition   Stable    Date/Time   Wed Jun 4, 2025 10:03 AM    Comment   Nely Mitchell discharge to home/self care.                   Follow-up Information       Follow up With Specialties Details Why Contact Info Additional Information    Chevy Sumner DO Family Medicine Schedule an appointment as soon as possible for a visit   [address] [facility] [id number] [phone]        [facility] Emergency Medicine Go to  As needed [facility] [facility] [address] [phone] [facility], [facility] [place], [place], [address]

## 2025-06-04 NOTE — ED PROVIDER NOTES
Time reflects when diagnosis was documented in both MDM as applicable and the Disposition within this note       Time User Action Codes Description Comment    6/4/2025 10:02 AM Liseth Robison [R07.89] Chest wall pain     6/4/2025 10:02 AM Liseth Robison [R00.2] Palpitations           ED Disposition       ED Disposition   Discharge    Condition   Stable    Date/Time   Wed Jun 4, 2025 10:03 AM    Comment   Nely Mitchell discharge to home/self care.                   Assessment & Plan       Medical Decision Making  Amount and/or Complexity of Data Reviewed  Radiology: ordered and independent interpretation performed.    Risk  Prescription drug management.      Nely Mitchell is a 24 year old female no significant past med history presenting for chest pain.   Differential includes MSK etiology, pneumonia, pericarditis.  Patient's EKG unremarkable.  Chest x-ray without cardiopulmonary abnormalities, just showing scoliosis.  Told to follow-up with primary care doctor.    Dispo: discharge to home  Prescriptions: None  Other: follow-up with PCP    Discussed results and plan with patient. Patient was agreeable and expressed understanding. Discussed return precautions.,      ED Course as of 06/04/25 1335   Wed Jun 04, 2025   0931 Initial EKG with lots of artifact, repeat done which shows normal sinus rhythm, rate of 82, QTc 425, no depressions or elevations.       Medications   ibuprofen (MOTRIN) tablet 400 mg (400 mg Oral Given 6/4/25 0929)       ED Risk Strat Scores          No data recorded    PERC Rule for PE      Flowsheet Row Most Recent Value   PERC Rule for PE    Age >=50 0 Filed at: 06/04/2025 0923   HR >=100 0 Filed at: 06/04/2025 0923   O2 Sat on room air < 95% 0 Filed at: 06/04/2025 0923   History of PE or DVT 0 Filed at: 06/04/2025 0923   Recent trauma or surgery 0 Filed at: 06/04/2025 0923   Hemoptysis 0 Filed at: 06/04/2025 0923   Exogenous estrogen 0 Filed at: 06/04/2025 0923   Unilateral leg swelling 0  "Filed at: 06/04/2025 0923   PERC Rule for PE Results 0 Filed at: 06/04/2025 0923            SBIRT 22yo+      Flowsheet Row Most Recent Value   Initial Alcohol Screen: US AUDIT-C     1. How often do you have a drink containing alcohol? 3 Filed at: 06/04/2025 0839   2. How many drinks containing alcohol do you have on a typical day you are drinking?  1 Filed at: 06/04/2025 0839   3a. Male UNDER 65: How often do you have five or more drinks on one occasion? 0 Filed at: 06/04/2025 0839   3b. FEMALE Any Age, or MALE 65+: How often do you have 4 or more drinks on one occassion? 0 Filed at: 06/04/2025 0839   Audit-C Score 4 Filed at: 06/04/2025 0839   ABIMAEL: How many times in the past year have you...    Used an illegal drug or used a prescription medication for non-medical reasons? Never Filed at: 06/04/2025 0839            Wells' Criteria for PE      Flowsheet Row Most Recent Value   Wells' Criteria for PE    Clinical signs and symptoms of DVT 0 Filed at: 06/04/2025 0922   PE is primary diagnosis or equally likely 0 Filed at: 06/04/2025 0922   HR >100 0 Filed at: 06/04/2025 0922   Immobilization at least 3 days or Surgery in the previous 4 weeks 0 Filed at: 06/04/2025 0922   Previous, objectively diagnosed PE or DVT 0 Filed at: 06/04/2025 0922   Hemoptysis 0 Filed at: 06/04/2025 0922   Malignancy with treatment within 6 months or palliative 0 Filed at: 06/04/2025 0922   Wells' Criteria Total 0 Filed at: 06/04/2025 0922                        History of Present Illness       Chief Complaint   Patient presents with    Chest Pain     Arrives with c/o left sided CP, favoring shoulder area, that started last night, was getting ready for bed when pain started, feels like pressure, hx of costochondritis, feels different than this. Pain radiates to back, Watch alerted patient that HR was elevated. Denies dizziness, reports \"difficulty catching my breath.\" Recent cold over the weekend, was taking prednisone.       Past Medical " History[1]   Past Surgical History[2]   Family History[3]   Social History[4]   E-Cigarette/Vaping    E-Cigarette Use Never User       E-Cigarette/Vaping Substances    Nicotine No     THC No     CBD No     Flavoring No     Other No     Unknown No       I have reviewed and agree with the history as documented.       Chest Pain  Associated symptoms: no abdominal pain, no back pain, no cough, no dizziness, no fever, no headache, no nausea, no shortness of breath and not vomiting        Nely Mitchell is a 24 year old female no significant past med history presenting for chest pain.  For the past week, patient has had some nasal congestion and productive cough.  She began having left-sided chest pain that favors her left shoulder last night when she was getting ready for bed.  She continued to have the chest pain this morning prompting ED evaluation.  She has no associated fever, shortness of breath, nausea, vomiting, abdominal pain, lightheadedness, numbness or paresthesias.    Review of Systems   Constitutional:  Negative for chills and fever.   HENT:  Negative for congestion and rhinorrhea.    Eyes:  Negative for visual disturbance.   Respiratory:  Negative for cough and shortness of breath.    Cardiovascular:  Positive for chest pain.   Gastrointestinal:  Negative for abdominal pain, nausea and vomiting.   Genitourinary:  Negative for dysuria and hematuria.   Musculoskeletal:  Negative for back pain and neck pain.   Skin:  Negative for wound.   Neurological:  Negative for dizziness, light-headedness and headaches.           Objective       ED Triage Vitals   Temperature Pulse Blood Pressure Respirations SpO2 Patient Position - Orthostatic VS   06/04/25 0834 06/04/25 0834 06/04/25 0834 06/04/25 0834 06/04/25 0834 06/04/25 0834   98.6 °F (37 °C) 92 133/81 18 100 % Sitting      Temp Source Heart Rate Source BP Location FiO2 (%) Pain Score    06/04/25 0834 06/04/25 0834 06/04/25 0834 -- 06/04/25 0929    Oral Monitor Right  arm  5      Vitals      Date and Time Temp Pulse SpO2 Resp BP Pain Score FACES Pain Rating User   06/04/25 0929 -- -- -- -- -- 5 -- AF   06/04/25 0834 98.6 °F (37 °C) 92 100 % 18 133/81 -- -- KK            Physical Exam  Constitutional:       General: She is not in acute distress.  HENT:      Head: Normocephalic and atraumatic.      Mouth/Throat:      Mouth: Mucous membranes are moist.      Pharynx: Oropharynx is clear.     Eyes:      Extraocular Movements: Extraocular movements intact.      Conjunctiva/sclera: Conjunctivae normal.       Cardiovascular:      Rate and Rhythm: Normal rate and regular rhythm.      Pulses: Normal pulses.      Heart sounds: Normal heart sounds.   Pulmonary:      Effort: Pulmonary effort is normal. No respiratory distress.   Chest:      Chest wall: Tenderness (Tender to palpation over left side of chest.) present.   Abdominal:      Palpations: Abdomen is soft.      Tenderness: There is no abdominal tenderness.     Musculoskeletal:         General: No tenderness or deformity. Normal range of motion.      Cervical back: Normal range of motion.     Skin:     General: Skin is warm and dry.      Capillary Refill: Capillary refill takes less than 2 seconds.     Neurological:      General: No focal deficit present.      Mental Status: She is alert and oriented to person, place, and time. Mental status is at baseline.         Results Reviewed       None            XR chest 2 views   ED Interpretation by Juan Robison MD (06/04 1003)   This film was interpreted independently by me.  No infiltrate, cardiac silhouette normal, no pleural effusions or pulmonary edema.   Dextro-scoliosis noted.       Final Interpretation by Aj Najera MD (06/04 1036)      No acute cardiopulmonary disease.            Resident: ALEXUS HARDEN I, the attending radiologist, have reviewed the images and agree with the final report above.      Workstation performed: [id number]             ECG 12 Lead  Documentation Only    Date/Time: 6/4/2025 2:02 PM    Performed by: Liseth Robison MD  Authorized by: Liseth Robison MD    Indications / Diagnosis:  CP  Patient location:  ED  Interpretation:     Interpretation: normal    Rate:     ECG rate:  82    ECG rate assessment: normal    Rhythm:     Rhythm: sinus rhythm    Ectopy:     Ectopy: none    QRS:     QRS axis:  Normal    QRS intervals:  Normal  Conduction:     Conduction: normal    ST segments:     ST segments:  Normal  T waves:     T waves: normal        ED Medication and Procedure Management   Prior to Admission Medications   Prescriptions Last Dose Informant Patient Reported? Taking?   Multiple Vitamin (MULTIVITAMIN) capsule  Self Yes No   Sig: Take 1 capsule by mouth daily   predniSONE 20 mg tablet   No No   Sig: Take 2 tablets (40 mg total) by mouth daily      Facility-Administered Medications: None     Discharge Medication List as of 6/4/2025 10:04 AM        CONTINUE these medications which have NOT CHANGED    Details   Multiple Vitamin (MULTIVITAMIN) capsule Take 1 capsule by mouth daily, Historical Med      predniSONE 20 mg tablet Take 2 tablets (40 mg total) by mouth daily, Starting Sun 6/1/2025, Normal           No discharge procedures on file.  ED SEPSIS DOCUMENTATION   Time reflects when diagnosis was documented in both MDM as applicable and the Disposition within this note       Time User Action Codes Description Comment    6/4/2025 10:02 AM Liseth Robison [R07.89] Chest wall pain     6/4/2025 10:02 AM Liseth Robison [R00.2] Palpitations                      [1]   Past Medical History:  Diagnosis Date    Anxiety     Headache(784.0)     Known health problems: none     Scoliosis    [2]   Past Surgical History:  Procedure Laterality Date    CYSTOSCOPY      Diagnostic. Last assessed 8/26/2015      NO PAST SURGERIES     [3]   Family History  Problem Relation Name Age of Onset    Cholelithiasis Mother Yaquelin     Anxiety disorder Mother Yaquelin      Hyperlipidemia Father Del     Breast cancer Maternal Grandmother Fariba     Cervical cancer Maternal Grandmother Fariba     Dementia Maternal Grandmother Fariba     Cancer Maternal Grandmother Fariba     Breast cancer Paternal Aunt Ariella Aceves Family      Breast cancer Family      Cervical cancer Family      Cancer Paternal Aunt Ariella Guillen     Cancer Paternal Aunt Ariella Guillen     Breast cancer Paternal Aunt Ariella Guillen    [4]   Social History  Tobacco Use    Smoking status: Never    Smokeless tobacco: Never   Vaping Use    Vaping status: Never Used   Substance Use Topics    Alcohol use: No    Drug use: No        Liseth Robison MD  06/04/25 8119

## 2025-06-08 LAB
ATRIAL RATE: 82 BPM
ATRIAL RATE: 84 BPM
P AXIS: 24 DEGREES
P AXIS: 43 DEGREES
PR INTERVAL: 116 MS
PR INTERVAL: 118 MS
QRS AXIS: 75 DEGREES
QRS AXIS: 77 DEGREES
QRSD INTERVAL: 74 MS
QRSD INTERVAL: 74 MS
QT INTERVAL: 356 MS
QT INTERVAL: 364 MS
QTC INTERVAL: 420 MS
QTC INTERVAL: 425 MS
T WAVE AXIS: 14 DEGREES
T WAVE AXIS: 25 DEGREES
VENTRICULAR RATE: 82 BPM
VENTRICULAR RATE: 84 BPM

## 2025-06-08 PROCEDURE — 93010 ELECTROCARDIOGRAM REPORT: CPT | Performed by: INTERNAL MEDICINE

## 2025-06-16 ENCOUNTER — OFFICE VISIT (OUTPATIENT)
Dept: FAMILY MEDICINE CLINIC | Facility: CLINIC | Age: 25
End: 2025-06-16
Payer: COMMERCIAL

## 2025-06-16 VITALS
HEIGHT: 64 IN | SYSTOLIC BLOOD PRESSURE: 114 MMHG | HEART RATE: 91 BPM | TEMPERATURE: 97.7 F | RESPIRATION RATE: 18 BRPM | DIASTOLIC BLOOD PRESSURE: 80 MMHG | OXYGEN SATURATION: 99 % | BODY MASS INDEX: 23.13 KG/M2 | WEIGHT: 135.5 LBS

## 2025-06-16 DIAGNOSIS — R00.2 PALPITATIONS: ICD-10-CM

## 2025-06-16 DIAGNOSIS — R07.89 CHEST WALL PAIN: Primary | ICD-10-CM

## 2025-06-16 PROCEDURE — 99213 OFFICE O/P EST LOW 20 MIN: CPT

## 2025-06-16 NOTE — PROGRESS NOTES
"Name: Nely Mitchell      : 2000      MRN: [id number]  Encounter Provider: Chevy Sumner DO  Encounter Date: 2025   Encounter department: [facility]  :  Assessment & Plan  Chest wall pain         Palpitations         Symptoms have resolved since discontinuing prednisone.  EKG and chest x-ray from ED reviewed, unremarkable.  Patient back to baseline.       History of Present Illness   HPI    Patient was in the ED  with chest wall pain as well as palpitations.  Started on prednisone  for sore throat/upper respiratory tract infection per urgent care.  EKG and chest x-ray in the ED was unremarkable.  Patient discontinued prednisone and symptoms resolved.  Denies any chest wall pain or palpitations at this time.      Review of Systems   Constitutional:  Negative for chills and fever.   HENT:  Negative for congestion and rhinorrhea.    Eyes:  Negative for visual disturbance.   Respiratory:  Negative for cough and shortness of breath.    Cardiovascular:  Negative for chest pain and palpitations.   Gastrointestinal:  Negative for abdominal pain, constipation, diarrhea, nausea and vomiting.   Genitourinary:  Negative for dysuria.   Musculoskeletal:  Negative for arthralgias.   Skin:  Negative for rash.   Neurological:  Negative for dizziness, light-headedness and headaches.       Objective   /80 (BP Location: Left arm, Patient Position: Sitting, Cuff Size: Standard)   Pulse 91   Temp 97.7 °F (36.5 °C) (Temporal)   Resp 18   Ht 5' 4\" (1.626 m)   Wt 61.5 kg (135 lb 8 oz)   LMP 2025 (Approximate)   SpO2 99%   BMI 23.26 kg/m²      Physical Exam  Vitals reviewed.   Constitutional:       Appearance: Normal appearance.   HENT:      Head: Normocephalic and atraumatic.      Right Ear: External ear normal.      Left Ear: External ear normal.      Nose: Nose normal.      Mouth/Throat:      Pharynx: Oropharynx is clear.     Eyes:      Extraocular Movements: Extraocular movements " intact.      Conjunctiva/sclera: Conjunctivae normal.       Cardiovascular:      Rate and Rhythm: Normal rate and regular rhythm.      Pulses: Normal pulses.      Heart sounds: Normal heart sounds.   Pulmonary:      Effort: Pulmonary effort is normal.      Breath sounds: Normal breath sounds.   Abdominal:      Palpations: Abdomen is soft.     Musculoskeletal:      Cervical back: Neck supple.      Right lower leg: No edema.      Left lower leg: No edema.     Skin:     General: Skin is warm.     Neurological:      Mental Status: She is alert and oriented to person, place, and time.     Psychiatric:         Mood and Affect: Mood normal.         Behavior: Behavior normal.         Thought Content: Thought content normal.         Judgment: Judgment normal.

## 2025-06-23 ENCOUNTER — OFFICE VISIT (OUTPATIENT)
Dept: OBGYN CLINIC | Facility: MEDICAL CENTER | Age: 25
End: 2025-06-23
Payer: COMMERCIAL

## 2025-06-23 VITALS
HEIGHT: 64 IN | DIASTOLIC BLOOD PRESSURE: 62 MMHG | BODY MASS INDEX: 23.05 KG/M2 | WEIGHT: 135 LBS | SYSTOLIC BLOOD PRESSURE: 94 MMHG

## 2025-06-23 DIAGNOSIS — Z01.419 ENCOUNTER FOR GYNECOLOGICAL EXAMINATION (GENERAL) (ROUTINE) WITHOUT ABNORMAL FINDINGS: Primary | ICD-10-CM

## 2025-06-23 DIAGNOSIS — Z12.4 PAP SMEAR FOR CERVICAL CANCER SCREENING: ICD-10-CM

## 2025-06-23 PROCEDURE — G0145 SCR C/V CYTO,THINLAYER,RESCR: HCPCS | Performed by: STUDENT IN AN ORGANIZED HEALTH CARE EDUCATION/TRAINING PROGRAM

## 2025-06-23 PROCEDURE — S0610 ANNUAL GYNECOLOGICAL EXAMINA: HCPCS | Performed by: STUDENT IN AN ORGANIZED HEALTH CARE EDUCATION/TRAINING PROGRAM

## 2025-06-23 NOTE — ASSESSMENT & PLAN NOTE
-pap: collected today  -LMP: 5/21/25  -regular monthly menstrual cycles, denies dysmenorrhea or menorrhagia   -not currently sexually active, denies dyspareunia  -STD testing: denies  -smoking: denies  -drinks alcohol socially  -recommend 30 min of exercise daily   -denies family history of ovarian, colon cancer  -maternal grandmother and paternal aunt with breast cancer, both had negative genetic testing   -return in one year or earlier if needed     Orders:    Liquid-based pap, screening

## 2025-06-23 NOTE — PROGRESS NOTES
"Name: Nely Mitchell      : 2000      MRN: [id number]  Encounter Provider: Janessa Rao DO  Encounter Date: 2025   Encounter department: [facility]  :  Assessment & Plan  Encounter for gynecological examination (general) (routine) without abnormal findings  -pap: collected today  -LMP: 25  -regular monthly menstrual cycles, denies dysmenorrhea or menorrhagia   -not currently sexually active, denies dyspareunia  -STD testing: denies  -smoking: denies  -drinks alcohol socially  -recommend 30 min of exercise daily   -denies family history of ovarian, colon cancer  -maternal grandmother and paternal aunt with breast cancer, both had negative genetic testing   -return in one year or earlier if needed     Orders:    Liquid-based pap, screening    Pap smear for cervical cancer screening    Orders:    Ambulatory Referral to Obstetrics / Gynecology        History of Present Illness   HPI  Nely Mitchell is a 24 y.o. female G0 LMP 25 presents for annual exam.       Review of Systems   Constitutional:  Negative for appetite change, chills, fatigue and fever.   Respiratory:  Negative for cough, chest tightness, shortness of breath and wheezing.    Cardiovascular:  Negative for chest pain, palpitations and leg swelling.   Gastrointestinal:  Negative for abdominal distention, abdominal pain, constipation, diarrhea, nausea and vomiting.   Endocrine: Negative for cold intolerance, heat intolerance and polyuria.   Genitourinary:  Negative for difficulty urinating, dyspareunia, dysuria, genital sores, menstrual problem, vaginal bleeding, vaginal discharge and vaginal pain.   Neurological:  Negative for dizziness, weakness, light-headedness and headaches.          Objective   BP 94/62 (BP Location: Left arm, Patient Position: Sitting, Cuff Size: Adult)   Ht 5' 4\" (1.626 m)   Wt 61.2 kg (135 lb)   LMP 2025 (Approximate)   BMI 23.17 kg/m²      Physical " Exam  Constitutional:       General: She is not in acute distress.     Appearance: Normal appearance. She is not ill-appearing.     Cardiovascular:      Rate and Rhythm: Normal rate.   Pulmonary:      Effort: Pulmonary effort is normal. No respiratory distress.   Chest:   Breasts:     Breasts are symmetrical.      Right: Normal. No swelling, bleeding, inverted nipple, mass, nipple discharge, skin change or tenderness.      Left: Normal. No swelling, bleeding, inverted nipple, mass, nipple discharge, skin change or tenderness.   Abdominal:      General: Abdomen is flat. There is no distension.      Palpations: Abdomen is soft.      Tenderness: There is no abdominal tenderness. There is no guarding or rebound.   Genitourinary:     General: Normal vulva.      Exam position: Lithotomy position.      Labia:         Right: No rash, tenderness, lesion or injury.         Left: No rash, tenderness, lesion or injury.       Urethra: No prolapse, urethral pain, urethral swelling or urethral lesion.      Vagina: Normal. No foreign body. No vaginal discharge, erythema, tenderness, bleeding or lesions.      Cervix: Normal. No cervical motion tenderness, discharge, friability, lesion, erythema, cervical bleeding or eversion.      Uterus: Normal. Not enlarged, not fixed, not tender and no uterine prolapse.       Adnexa: Right adnexa normal and left adnexa normal.        Right: No mass, tenderness or fullness.          Left: No mass, tenderness or fullness.       Musculoskeletal:      Right lower leg: No edema.      Left lower leg: No edema.   Lymphadenopathy:      Upper Body:      Right upper body: No supraclavicular, axillary or pectoral adenopathy.      Left upper body: No supraclavicular, axillary or pectoral adenopathy.     Neurological:      General: No focal deficit present.      Mental Status: She is alert and oriented to person, place, and time.     Psychiatric:         Mood and Affect: Mood normal.         Behavior:  Behavior normal.         Thought Content: Thought content normal.         Judgment: Judgment normal.

## 2025-06-26 LAB
LAB AP GYN PRIMARY INTERPRETATION: NORMAL
Lab: NORMAL